# Patient Record
Sex: FEMALE | Race: OTHER | HISPANIC OR LATINO
[De-identification: names, ages, dates, MRNs, and addresses within clinical notes are randomized per-mention and may not be internally consistent; named-entity substitution may affect disease eponyms.]

---

## 2019-11-01 ENCOUNTER — APPOINTMENT (OUTPATIENT)
Dept: GASTROENTEROLOGY | Facility: CLINIC | Age: 19
End: 2019-11-01
Payer: MEDICAID

## 2019-11-01 VITALS
BODY MASS INDEX: 28.7 KG/M2 | OXYGEN SATURATION: 98 % | WEIGHT: 162 LBS | HEART RATE: 98 BPM | SYSTOLIC BLOOD PRESSURE: 107 MMHG | HEIGHT: 63 IN | DIASTOLIC BLOOD PRESSURE: 59 MMHG

## 2019-11-01 PROBLEM — Z00.00 ENCOUNTER FOR PREVENTIVE HEALTH EXAMINATION: Status: ACTIVE | Noted: 2019-11-01

## 2019-11-01 PROCEDURE — 99204 OFFICE O/P NEW MOD 45 MIN: CPT

## 2019-11-01 NOTE — CONSULT LETTER
[Dear  ___] : Dear  [unfilled], [Consult Letter:] : I had the pleasure of evaluating your patient, [unfilled]. [Please see my note below.] : Please see my note below. [FreeTextEntry1] : Thank you very much for allowing me to participate in the care of this patient.  If you have any questions, please do not hesitate to contact me.\par \par Sincerely, \par \par Arcadio Chavarria MD\par

## 2019-11-01 NOTE — ASSESSMENT
[FreeTextEntry1] : Suspect functional vs musculoskeletal less likely biliary, duodenal, but will w/u for GI etiologies in light of persistenc, though I doubt significant pathology with symptom chronicity and wt gain, and neg w/u as noted above.\par \par -US\par -if neg, CCK-HIDA\par -trial pepcid chewable\par -if persists, will consider EGD, per parent's request.\par

## 2019-11-01 NOTE — HISTORY OF PRESENT ILLNESS
[FreeTextEntry1] : 19f w/ mild intellectual disability, ovarian cysts, here for about 2years.  Somewhat variable history obtained - mother states that at times seems postprandial, but at other times seems to happen while walking and sharp "stitch-like" sensation reproduced.  There may be some radiation down her RLE.  The pain is lateral, mid-axillary, perhaps slightly anterior - over lowest ribs.  No n/v.  + Wt gain.  Eats lots of fried foods.\par \par She has had multiple MD visits, ER encounters, with w/u as follows:\par 2/2018 US neg\par 3/2018 CBC, CMET normal, lipase normal; UA no rbc/wbc\par 8/2018 ER visit CBC, CMET , lipase normal exc WBC 11s; HCG neg; ua no rbc/wbc; CT oral/iv neg\par \par Saw a GI - recommended EGD - lost ins.\par \par No medication trial yet.  Pill aversion.\par Started miralax - BM freq increased, but pain persists.\par \par Soc:  no tobacco or significant EtOH\par FHx: no FHx GI malignancy or IBD\par \par ROS:\par Constitutional:: no weight loss, fevers\par ENT: no deafness\par Eyes: not blind\par Neck: no LN\par Chest: no dyspnea/cough\par Cardiac: no chest pain\par Vascular: no leg swelling\par GI: no abdominal pain, nausea, vomiting, diarrhea, constipation, rectal bleeding, dysphagia, melena unless otherwise noted in HPI\par : no dysuria, dark urine\par Skin: no rashes, jaundice\par Heme: no bleeding\par \par Px: (VS noted below)\par General: NAD\par Eyes: anicteric\par Oropharynx:  clear\par Neck: no LN\par Chest: normal respiratory effort\par CVS: regular\par Abd: soft, NT except minimal R mid-axillary, R lower ribs, possibly mild RUQ; no rebound/guarding. ND, +BS, no HSM\par Ext: no atrophy\par Neuro: grossly nonfocal\par Smling, happy during exam.  \par

## 2019-11-05 ENCOUNTER — RESULT REVIEW (OUTPATIENT)
Age: 19
End: 2019-11-05

## 2020-02-10 ENCOUNTER — APPOINTMENT (OUTPATIENT)
Dept: GASTROENTEROLOGY | Facility: CLINIC | Age: 20
End: 2020-02-10

## 2020-02-10 VITALS
DIASTOLIC BLOOD PRESSURE: 64 MMHG | HEART RATE: 96 BPM | BODY MASS INDEX: 30.12 KG/M2 | HEIGHT: 63 IN | WEIGHT: 170 LBS | SYSTOLIC BLOOD PRESSURE: 100 MMHG

## 2020-02-10 RX ORDER — FAMOTIDINE/CA CARB/MAG HYDROX 10-800-165
10-800-165 TABLET,CHEWABLE ORAL TWICE DAILY
Qty: 60 | Refills: 0 | Status: DISCONTINUED | COMMUNITY
Start: 2019-11-01 | End: 2020-02-10

## 2020-02-12 ENCOUNTER — APPOINTMENT (OUTPATIENT)
Dept: GASTROENTEROLOGY | Facility: CLINIC | Age: 20
End: 2020-02-12
Payer: MEDICAID

## 2020-02-12 VITALS
WEIGHT: 170 LBS | DIASTOLIC BLOOD PRESSURE: 83 MMHG | SYSTOLIC BLOOD PRESSURE: 107 MMHG | OXYGEN SATURATION: 97 % | HEIGHT: 63 IN | BODY MASS INDEX: 30.12 KG/M2 | HEART RATE: 88 BPM

## 2020-02-12 PROCEDURE — 99214 OFFICE O/P EST MOD 30 MIN: CPT

## 2020-02-12 NOTE — ASSESSMENT
[FreeTextEntry1] : 1) abdominal pain - seems improved w/ above measures - suspect functional and related to constipation - pain seems variable and related to BM frequency, doing better on miralax. Encouraged more liberal/daily use and stop pepcid after a month.  If sx recur, advised f/u.\par \par 2) Constipation - reviewed dietary and lifestyle modifications, including increased fluid, fiber intake, as well as habituation / following urge to defecate, cutting back on bread/pasta\par

## 2023-06-15 ENCOUNTER — APPOINTMENT (OUTPATIENT)
Dept: INTERNAL MEDICINE | Facility: CLINIC | Age: 23
End: 2023-06-15
Payer: MEDICAID

## 2023-06-15 VITALS
SYSTOLIC BLOOD PRESSURE: 96 MMHG | HEART RATE: 85 BPM | OXYGEN SATURATION: 97 % | TEMPERATURE: 98.3 F | WEIGHT: 162.4 LBS | DIASTOLIC BLOOD PRESSURE: 74 MMHG

## 2023-06-15 DIAGNOSIS — J30.89 OTHER ALLERGIC RHINITIS: ICD-10-CM

## 2023-06-15 DIAGNOSIS — Z78.9 OTHER SPECIFIED HEALTH STATUS: ICD-10-CM

## 2023-06-15 DIAGNOSIS — Z82.49 FAMILY HISTORY OF ISCHEMIC HEART DISEASE AND OTHER DISEASES OF THE CIRCULATORY SYSTEM: ICD-10-CM

## 2023-06-15 DIAGNOSIS — Z87.898 PERSONAL HISTORY OF OTHER SPECIFIED CONDITIONS: ICD-10-CM

## 2023-06-15 PROCEDURE — 99203 OFFICE O/P NEW LOW 30 MIN: CPT | Mod: 25

## 2023-06-15 RX ORDER — LORATADINE 5 MG
17 TABLET,CHEWABLE ORAL DAILY
Qty: 1530 | Refills: 3 | Status: ACTIVE | COMMUNITY
Start: 2023-06-15

## 2023-06-15 NOTE — HISTORY OF PRESENT ILLNESS
[FreeTextEntry8] : Marissa comes in with her mother.  She needs to establish care.  She has been having pain in the right upper quadrant for many years and she has been evaluated.  Today the pain is less.  Patient also has mental retardation and is living at home with mother and aide.  She does not seem to be in pain at this moment. Former px on [facility]

## 2023-06-15 NOTE — REVIEW OF SYSTEMS
[Abdominal Pain] : abdominal pain [Nausea] : no nausea [Constipation] : constipation [Heartburn] : no heartburn [Negative] : Neurological [FreeTextEntry7] : ?? RUQ pain

## 2023-06-15 NOTE — PHYSICAL EXAM
[Normal] : normal gait, coordination grossly intact, no focal deficits [de-identified] : unable to sustain a meaningful conversation, not agitated , pleasant

## 2023-06-16 LAB
ALBUMIN SERPL ELPH-MCNC: 4.5 G/DL
ALP BLD-CCNC: 63 U/L
ALT SERPL-CCNC: 18 U/L
ANION GAP SERPL CALC-SCNC: 12 MMOL/L
AST SERPL-CCNC: 14 U/L
BILIRUB SERPL-MCNC: 0.3 MG/DL
BUN SERPL-MCNC: 12 MG/DL
CALCIUM SERPL-MCNC: 9.8 MG/DL
CHLORIDE SERPL-SCNC: 106 MMOL/L
CO2 SERPL-SCNC: 25 MMOL/L
CREAT SERPL-MCNC: 0.68 MG/DL
EGFR: 125 ML/MIN/1.73M2
GGT SERPL-CCNC: 18 U/L
GLUCOSE SERPL-MCNC: 77 MG/DL
POTASSIUM SERPL-SCNC: 4.6 MMOL/L
PROT SERPL-MCNC: 7.4 G/DL
SODIUM SERPL-SCNC: 143 MMOL/L

## 2023-09-08 ENCOUNTER — APPOINTMENT (OUTPATIENT)
Dept: INTERNAL MEDICINE | Facility: CLINIC | Age: 23
End: 2023-09-08
Payer: MEDICAID

## 2023-09-08 VITALS
SYSTOLIC BLOOD PRESSURE: 122 MMHG | TEMPERATURE: 99.1 F | HEART RATE: 93 BPM | DIASTOLIC BLOOD PRESSURE: 80 MMHG | WEIGHT: 168 LBS | HEIGHT: 63 IN | BODY MASS INDEX: 29.77 KG/M2 | OXYGEN SATURATION: 99 %

## 2023-09-08 DIAGNOSIS — K59.00 CONSTIPATION, UNSPECIFIED: ICD-10-CM

## 2023-09-08 DIAGNOSIS — Z76.89 PERSONS ENCOUNTERING HEALTH SERVICES IN OTHER SPECIFIED CIRCUMSTANCES: ICD-10-CM

## 2023-09-08 DIAGNOSIS — Z00.01 ENCOUNTER FOR GENERAL ADULT MEDICAL EXAMINATION WITH ABNORMAL FINDINGS: ICD-10-CM

## 2023-09-08 DIAGNOSIS — R10.11 RIGHT UPPER QUADRANT PAIN: ICD-10-CM

## 2023-09-08 DIAGNOSIS — G89.29 RIGHT UPPER QUADRANT PAIN: ICD-10-CM

## 2023-09-08 PROCEDURE — 99395 PREV VISIT EST AGE 18-39: CPT | Mod: 25

## 2023-09-08 NOTE — REVIEW OF SYSTEMS
[Abdominal Pain] : abdominal pain [Constipation] : constipation [Fever] : no fever [Fatigue] : no fatigue [Recent Change In Weight] : ~T recent weight change [Nausea] : no nausea [Heartburn] : no heartburn [Negative] : Heme/Lymph [FreeTextEntry2] : gained [FreeTextEntry7] : ?? RUQ pain

## 2023-09-08 NOTE — HEALTH RISK ASSESSMENT
[Good] : ~his/her~  mood as  good [No] : No [No falls in past year] : Patient reported no falls in the past year [Other reason not done] : Other reason not done [HIV test declined] : HIV test declined [With Family] : lives with family [Unemployed] : unemployed [High School] : high school [Single] : single [Fully functional (bathing, dressing, toileting, transferring, walking, feeding)] : Fully functional (bathing, dressing, toileting, transferring, walking, feeding) [Patient/Caregiver not ready to engage] : , patient/caregiver not ready to engage [Never] : Never [de-identified] : MR

## 2023-09-08 NOTE — PHYSICAL EXAM
[Normal] : normal sclera/conjunctiva, PERRL, EOMI [de-identified] : bilat no gross lumps [de-identified] : no rash [de-identified] : unable to sustain a meaningful conversation, not agitated , pleasant

## 2023-09-11 LAB
25(OH)D3 SERPL-MCNC: 19 NG/ML
ALBUMIN SERPL ELPH-MCNC: 4.9 G/DL
ALP BLD-CCNC: 71 U/L
ALT SERPL-CCNC: 17 U/L
ANION GAP SERPL CALC-SCNC: 11 MMOL/L
APPEARANCE: CLEAR
AST SERPL-CCNC: 15 U/L
BASOPHILS # BLD AUTO: 0.05 K/UL
BASOPHILS NFR BLD AUTO: 0.6 %
BILIRUB SERPL-MCNC: 0.4 MG/DL
BILIRUBIN URINE: NEGATIVE
BLOOD URINE: NEGATIVE
BUN SERPL-MCNC: 10 MG/DL
CALCIUM SERPL-MCNC: 9.5 MG/DL
CHLORIDE SERPL-SCNC: 107 MMOL/L
CHOLEST SERPL-MCNC: 131 MG/DL
CO2 SERPL-SCNC: 23 MMOL/L
COLOR: YELLOW
CREAT SERPL-MCNC: 0.63 MG/DL
EGFR: 127 ML/MIN/1.73M2
EOSINOPHIL # BLD AUTO: 0.05 K/UL
EOSINOPHIL NFR BLD AUTO: 0.6 %
ESTIMATED AVERAGE GLUCOSE: 108 MG/DL
GLUCOSE QUALITATIVE U: NEGATIVE MG/DL
GLUCOSE SERPL-MCNC: 97 MG/DL
HBA1C MFR BLD HPLC: 5.4 %
HBV SURFACE AB SER QL: NONREACTIVE
HCT VFR BLD CALC: 44.2 %
HDLC SERPL-MCNC: 55 MG/DL
HGB BLD-MCNC: 14.2 G/DL
IMM GRANULOCYTES NFR BLD AUTO: 0.4 %
KETONES URINE: NEGATIVE MG/DL
LDLC SERPL CALC-MCNC: 65 MG/DL
LEUKOCYTE ESTERASE URINE: NEGATIVE
LYMPHOCYTES # BLD AUTO: 2.14 K/UL
LYMPHOCYTES NFR BLD AUTO: 26.4 %
MAN DIFF?: NORMAL
MCHC RBC-ENTMCNC: 28.6 PG
MCHC RBC-ENTMCNC: 32.1 GM/DL
MCV RBC AUTO: 88.9 FL
MONOCYTES # BLD AUTO: 0.46 K/UL
MONOCYTES NFR BLD AUTO: 5.7 %
NEUTROPHILS # BLD AUTO: 5.38 K/UL
NEUTROPHILS NFR BLD AUTO: 66.3 %
NITRITE URINE: NEGATIVE
NONHDLC SERPL-MCNC: 76 MG/DL
PH URINE: 6.5
PLATELET # BLD AUTO: 252 K/UL
POTASSIUM SERPL-SCNC: 4.4 MMOL/L
PROT SERPL-MCNC: 7.4 G/DL
PROTEIN URINE: NEGATIVE MG/DL
RBC # BLD: 4.97 M/UL
RBC # FLD: 12.7 %
SODIUM SERPL-SCNC: 141 MMOL/L
SPECIFIC GRAVITY URINE: 1.02
T4 FREE SERPL-MCNC: 1.2 NG/DL
TRIGL SERPL-MCNC: 44 MG/DL
TSH SERPL-ACNC: 1.4 UIU/ML
UROBILINOGEN URINE: 0.2 MG/DL
WBC # FLD AUTO: 8.11 K/UL

## 2023-09-25 ENCOUNTER — RESULT REVIEW (OUTPATIENT)
Age: 23
End: 2023-09-25

## 2023-10-18 ENCOUNTER — APPOINTMENT (OUTPATIENT)
Dept: INTERNAL MEDICINE | Facility: CLINIC | Age: 23
End: 2023-10-18
Payer: MEDICAID

## 2023-10-18 VITALS
DIASTOLIC BLOOD PRESSURE: 72 MMHG | WEIGHT: 173 LBS | BODY MASS INDEX: 30.65 KG/M2 | SYSTOLIC BLOOD PRESSURE: 96 MMHG | OXYGEN SATURATION: 99 % | HEIGHT: 63 IN | TEMPERATURE: 98.7 F | HEART RATE: 95 BPM

## 2023-10-18 DIAGNOSIS — F79 UNSPECIFIED INTELLECTUAL DISABILITIES: ICD-10-CM

## 2023-10-18 PROCEDURE — 99213 OFFICE O/P EST LOW 20 MIN: CPT

## 2023-10-20 ENCOUNTER — APPOINTMENT (OUTPATIENT)
Dept: PODIATRY | Facility: CLINIC | Age: 23
End: 2023-10-20
Payer: MEDICAID

## 2023-10-20 VITALS
SYSTOLIC BLOOD PRESSURE: 125 MMHG | DIASTOLIC BLOOD PRESSURE: 82 MMHG | HEIGHT: 61.81 IN | OXYGEN SATURATION: 96 % | BODY MASS INDEX: 31.83 KG/M2 | WEIGHT: 173 LBS | HEART RATE: 87 BPM

## 2023-10-20 VITALS
HEART RATE: 88 BPM | HEIGHT: 61.81 IN | SYSTOLIC BLOOD PRESSURE: 125 MMHG | WEIGHT: 173 LBS | OXYGEN SATURATION: 98 % | BODY MASS INDEX: 31.83 KG/M2 | DIASTOLIC BLOOD PRESSURE: 82 MMHG

## 2023-10-20 PROCEDURE — 99203 OFFICE O/P NEW LOW 30 MIN: CPT | Mod: 25

## 2023-10-20 PROCEDURE — 17110 DESTRUCTION B9 LES UP TO 14: CPT

## 2023-11-01 ENCOUNTER — APPOINTMENT (OUTPATIENT)
Dept: PODIATRY | Facility: CLINIC | Age: 23
End: 2023-11-01
Payer: MEDICAID

## 2023-11-01 VITALS — BODY MASS INDEX: 32.66 KG/M2 | HEIGHT: 61 IN | WEIGHT: 173 LBS

## 2023-11-01 DIAGNOSIS — B07.0 PLANTAR WART: ICD-10-CM

## 2023-11-01 PROCEDURE — 17110 DESTRUCTION B9 LES UP TO 14: CPT

## 2023-11-15 ENCOUNTER — APPOINTMENT (OUTPATIENT)
Dept: PODIATRY | Facility: CLINIC | Age: 23
End: 2023-11-15
Payer: MEDICAID

## 2023-11-15 VITALS — DIASTOLIC BLOOD PRESSURE: 70 MMHG | HEIGHT: 61 IN | SYSTOLIC BLOOD PRESSURE: 110 MMHG

## 2023-11-15 PROCEDURE — 17110 DESTRUCTION B9 LES UP TO 14: CPT

## 2023-12-20 ENCOUNTER — APPOINTMENT (OUTPATIENT)
Dept: PODIATRY | Facility: CLINIC | Age: 23
End: 2023-12-20